# Patient Record
Sex: FEMALE | Race: WHITE | Employment: OTHER | URBAN - METROPOLITAN AREA
[De-identification: names, ages, dates, MRNs, and addresses within clinical notes are randomized per-mention and may not be internally consistent; named-entity substitution may affect disease eponyms.]

---

## 2019-04-11 ENCOUNTER — APPOINTMENT (OUTPATIENT)
Dept: CT IMAGING | Facility: HOSPITAL | Age: 84
End: 2019-04-11
Attending: EMERGENCY MEDICINE
Payer: MEDICARE

## 2019-04-11 PROCEDURE — 70450 CT HEAD/BRAIN W/O DYE: CPT | Performed by: EMERGENCY MEDICINE
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
## 2019-04-11 NOTE — ED NOTES
Pt aggressive, loud, threatening to leave. Security at bedside. MD at at bedside. Pt unable to be redirected at this time. Per MD, ok to order Ativan 2mg IM and Haldol 5mg IM along with hard restraints.

## 2019-04-12 NOTE — ED NOTES
[facility] FOR TRANSPORT TO [facility], BELONGINGS BAG [id number] SEAL  INTACT GIVEN TO THEM.

## 2019-04-12 NOTE — ED NOTES
Received pt alert and talkative, niece at bedside supportive, awaiting tranfer to [facility], pt cooperative, vss, breakfast tray ordered

## 2019-04-12 NOTE — ED NOTES
[facility] CALLED FOR TRANSPORT TO [address] ETA: [id number]. PUBLIC SAFETY HERE WITH BELONGINGS BAG [id number], SEAL INTACT.
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
## 2019-04-12 NOTE — ED NOTES
Pt clothes and shoes placed in smart safe bag and locked in a locker. Pt Niece took home some of pt belongings including pt purse.

## 2019-04-12 NOTE — ED NOTES
Gretta Green calling accepting pt, attempting report, arc cling back with correct number for rn to rn, pt aggitated at times, no distress noted,vss

## 2019-04-12 NOTE — ED NOTES
Pt given ativan, starting to get aggitated, yelling at niece, pt attemtping to get out of bed, this rn continually attempting to reorient pt unsuccessfully, [facility] calling after receiving fax, will loan back when pt can be transferred, no distress noted,
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
## 2021-01-21 PROBLEM — K21.9 GERD (GASTROESOPHAGEAL REFLUX DISEASE): Status: ACTIVE | Noted: 2019-05-13

## 2021-01-21 PROBLEM — R26.81 GAIT INSTABILITY: Status: ACTIVE | Noted: 2019-05-13

## 2021-01-21 PROBLEM — E03.9 HYPOTHYROIDISM: Status: ACTIVE | Noted: 2019-05-13

## 2021-01-21 PROBLEM — F03.90 DEMENTIA (HCC): Status: ACTIVE | Noted: 2019-05-13
